# Patient Record
Sex: FEMALE | Race: WHITE
[De-identification: names, ages, dates, MRNs, and addresses within clinical notes are randomized per-mention and may not be internally consistent; named-entity substitution may affect disease eponyms.]

---

## 2017-08-31 ENCOUNTER — HOSPITAL ENCOUNTER (EMERGENCY)
Dept: HOSPITAL 39 - ER | Age: 42
Discharge: HOME | End: 2017-08-31
Payer: COMMERCIAL

## 2017-08-31 VITALS — DIASTOLIC BLOOD PRESSURE: 75 MMHG | SYSTOLIC BLOOD PRESSURE: 105 MMHG

## 2017-08-31 VITALS — TEMPERATURE: 97.7 F

## 2017-08-31 VITALS — OXYGEN SATURATION: 100 %

## 2017-08-31 DIAGNOSIS — Z88.6: ICD-10-CM

## 2017-08-31 DIAGNOSIS — R60.0: ICD-10-CM
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
## 2017-08-31 DIAGNOSIS — Z88.0: ICD-10-CM

## 2017-08-31 DIAGNOSIS — T63.441A: Primary | ICD-10-CM

## 2017-08-31 DIAGNOSIS — Z88.2: ICD-10-CM
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
## 2017-08-31 NOTE — ED.PDOC
History of Present Illness





- General


Chief Complaint: Allergic Reaction


Stated Complaint: yellowjacket stings


Time Seen by Provider: 08/31/17 14:55


Source: patient


Exam Limitations: no limitations





- History of Present Illness


Initial Comments: 





the patient is a 42-year-old  female presenting to the emergency room 

secondary to final loss seems to the right forearm and hand.  This occurred 

while she was at work apparently.  This occurred approximately 2 hours prior to 

arrival.  The patient was not going to come in but she started have significant 

swelling of the right upper extremitysurrounding the sites.  She is not having 

any shortness of breath.  She is not having any hives.  She does have 

significant itching in that upper extremity however.  She is able to move the 

hand and arm well.  She is neurovascularly intact. No definite evidence of 

anaphylaxis at this time. This is more of a regional sting reaction.


Timing/Duration: 1-3 hours


Severity: moderate


Improving Factors: nothing


Worsening Factors: nothing


Associated Symptoms: denies symptoms


Allergies/Adverse Reactions: 


Allergies





Codeine Allergy (Verified 08/31/17 15:03)


 


Penicillins Allergy (Verified 08/31/17 15:04)


 


Sulfamethoxazole w/Trimethoprim [From Bactrim] Allergy (Verified 08/31/17 15:04)


 











Review of Systems





- Review of Systems


Constitutional: States: no symptoms reported


EENTM: States: no symptoms reported


Respiratory: States: no symptoms reported


Cardiology: States: no symptoms reported


Gastrointestinal/Abdominal: States: no symptoms reported


Genitourinary: States: no symptoms reported


Musculoskeletal: States: no symptoms reported


Skin: States: see HPI


Neurological: States: no symptoms reported


Endocrine: States: no symptoms reported


All other Systems: No Change from Baseline





Past Medical History (General)





- Patient Medical History


Hx Seizures: No


Hx Stroke: No


Hx Dementia: No


Hx Asthma: No


Hx of COPD: No


Hx Cardiac Disorders: No


Hx Congestive Heart Failure: No


Hx Pacemaker: No


Hx Thyroid Disease: No


Hx Diabetes: No


Hx Gastroesophageal Reflux: No


Surgical History: tonsillectomy





Family Medical History





- Family History


  ** Mother


Family History: Unknown





Physical Exam





- Physical Exam


General Appearance: Alert, Comfortable, No apparent distress


Eye Exam: bilateral normal


Ears, Nose, Throat: hearing grossly normal, normal ENT inspection, normal 

pharynx


Neck: non-tender, full range of motion, supple


Respiratory: lungs clear, normal breath sounds, no respiratory distress, no 

accessory muscle use


Cardiovascular/Chest: normal peripheral pulses, no edema


Peripheral Pulses: radial,right: 2+, radial,left: 2+


Gastrointestinal/Abdominal: soft


Rectal Exam: deferred


Extremity: normal range of motion, no pedal edema, normal capillary refill, 

swelling - surrounding the sites


Neurologic: CNs II-XII nml as tested, no motor/sensory deficits, alert, normal 

mood/affect, oriented x 3


Skin Exam: normal color - with the exception of the erythema and edema 

surrounding the sting sites to the right upper extremity


Comments: 





 Vital Signs - 24 hr











  08/31/17





  14:45


 


Temperature 97.7 F


 


Pulse Rate [ 78





pulse ox] 


 


Respiratory 20





Rate 


 


Blood Pressure 131/78





[left brachial] 


 


O2 Sat by Pulse 100





Oximetry 














Progress





- Progress


Progress: 





08/31/17 16:05


the patient is a 42-year-old  female presenting secondary to multiple 

wasp stings to the right upper extremity with a significant regional reaction.  

The patient has received a dose of Solu-Medrol, prednisone, Zyrtec and 

Singulair.  She is to take 10 mg of Zyrtec daily for the next 5 days as well. 

Reaction appears to be subsiding. No evidence of anaphylaxis.  Vital signs have 

remained stable.  She does need to keep some form of an antihistamine at her 

car or on her person at all times.  ER warnings were given for any significant 

worsening.





Departure





- Departure


Clinical Impression: 


Insect stings


Qualifiers:


 Encounter type: initial encounter Injury intent: accidental or unintentional 

Qualified Code(s): T63.481A - Toxic effect of venom of other arthropod, 

accidental (unintentional), initial encounter





Disposition: Discharge to Home or Self Care


Condition: Fair


Departure Forms:  ED Discharge - Pt. Copy, Patient Portal Self Enrollment


Diet: regular diet


Activity: increase activity as tolerated


Referrals: 


EKTA HWANG [Primary Care Provider] - 1-2 Weeks


Additional Instructions: 


the patient is a 42-year-old  female presenting secondary to multiple 

wasp stings to the right upper extremity with a significant regional reaction.  

The patient has received a dose of Solu-Medrol, prednisone, Zyrtec and 

Singulair.  She is to take 10 mg of Zyrtec daily for the next 5 days as well. 

Reaction appears to be subsiding. No evidence of anaphylaxis.  Vital signs have 

remained stable.  She does need to keep some form of an antihistamine at her 

car or on her person at all times.  ER warnings were given for any significant 

worsening.

## 2018-01-25 ENCOUNTER — HOSPITAL ENCOUNTER (EMERGENCY)
Dept: HOSPITAL 39 - ER | Age: 43
Discharge: HOME | End: 2018-01-25
Payer: COMMERCIAL

## 2018-01-25 VITALS — TEMPERATURE: 96.9 F | OXYGEN SATURATION: 100 %

## 2018-01-25 VITALS — SYSTOLIC BLOOD PRESSURE: 110 MMHG | DIASTOLIC BLOOD PRESSURE: 72 MMHG

## 2018-01-25 DIAGNOSIS — N30.90: ICD-10-CM

## 2018-01-25 DIAGNOSIS — T50.995A: Primary | ICD-10-CM

## 2018-01-25 PROCEDURE — 81001 URINALYSIS AUTO W/SCOPE: CPT

## 2018-01-25 PROCEDURE — 87086 URINE CULTURE/COLONY COUNT: CPT

## 2018-01-25 PROCEDURE — 36416 COLLJ CAPILLARY BLOOD SPEC: CPT

## 2018-01-25 NOTE — ED.PDOC
History of Present Illness





- General


Chief Complaint: Respiratory Problem


Stated Complaint: c/o SOB after taking Azo


Time Seen by Provider: 01/25/18 05:02


Source: patient, Vital Signs reviewed


Additional Information: 





42 YEAR OLD HERE AFTER HAVING A ALLERGIC REACTION TO AZO  HER  GAVE HER 

A SUB Q SHOT OF EPINEPHRINE ( THEY HAD BEEN PRESCRIBED AFTER SHE HAD ALLERGIC 

REACTION TO YELLOW JACKET FEW WEEKS EARLIER


SHE FELT HER THROAT WAS CLOSING AND GOT SHORT OF BREATH


SHE ALSO REPORTS THAT SHE DEVELOPED RASH FOLLOWING 4-5 DAYS OF TAKING 

METHYLPREDNISONE  BUT SHE STATES SHE CAN TOLERATE DECADRON





- History of Present Illness


Timing/Duration: 1 hour


Severity: moderate


Improving Factors: medication


Worsening Factors: nothing, medication


Associated Symptoms: shortness of breath


Allergies/Adverse Reactions: 


Allergies





Codeine Allergy (Verified 01/25/18 04:58)


 


Influenza Vaccines Allergy (Verified 01/25/18 04:58)


 


Penicillins Allergy (Verified 01/25/18 04:58)


 


Sulfamethoxazole w/Trimethoprim [From Bactrim] Allergy (Verified 08/31/17 15:04)


 








Home Medications: 


Ambulatory Orders





NK [NK]  01/25/18 











Review of Systems





- Review of Systems


Constitutional: States: no symptoms reported


EENTM: States: throat swelling


Respiratory: States: no symptoms reported, short of breath


Cardiology: States: no symptoms reported


Gastrointestinal/Abdominal: States: no symptoms reported


Genitourinary: States: no symptoms reported


Musculoskeletal: States: no symptoms reported


Skin: States: no symptoms reported


Neurological: States: no symptoms reported


Endocrine: States: no symptoms reported


Hematologic/Lymphatic: States: no symptoms reported





Past Medical History (General)





- Patient Medical History


Hx Seizures: No


Hx Stroke: No


Hx Dementia: No


Hx Asthma: No


Hx of COPD: No


Hx Cardiac Disorders: No


Hx Congestive Heart Failure: No


Hx Pacemaker: No


Hx Hypertension: No


Hx Thyroid Disease: No


Hx Diabetes: No


Hx Gastroesophageal Reflux: No


Hx Renal Disease: No


Hx of HIV: No


Hx MRSA: No


Surgical History: tonsillectomy





- Vaccination History


Hx Tetanus, Diphtheria Vaccination: No


Hx Influenza Vaccination: No - allergy


Hx Pneumococcal Vaccination: No





- Social History


Hx Tobacco Use: No


Hx Alcohol Use: No


Hx Substance Use: No


Hx Substance Use Treatment: No


Hx Depression: No





Family Medical History





- Family History


  ** Mother


Family History: Unknown





Physical Exam





- Physical Exam


General Appearance: Agitated, No apparent distress


Eye Exam: bilateral normal


Ears, Nose, Throat: hearing grossly normal, normal ENT inspection, normal 

pharynx


Neck: non-tender, full range of motion, supple


Respiratory: chest non-tender, lungs clear, normal breath sounds, no 

respiratory distress, no accessory muscle use


Cardiovascular/Chest: normal peripheral pulses, regular rate, rhythm, no edema, 

no gallop, no JVD


Peripheral Pulses: radial,right: 2+, radial,left: 2+, femoral,right: 2+, femoral

,left: 2+, popliteal,right: 2+, popliteal,left: 2+, dorsalis pedis,right: 2+


Gastrointestinal/Abdominal: normal bowel sounds, non tender, soft, no 

organomegaly, no pulsatile mass





Progress





- Results/Orders


Results/Orders: 


5.55 AM POST TREATMENT  SHE IS FEELING BETTER LUNGS CLEAR  PHARYNX  NO EDEMA  

NO STRIDOR  NO WHEEZING MAY BE DISCHARGED  ADVISED TO TAKE BENADRYL  AND PEPCID 

20 MG PO OTC  RETUEN IF SYMPTOMS RECUR








Departure





- Departure


Clinical Impression: 


 Allergic reaction caused by a drug





Time of Disposition: 05:57


Disposition: Discharge to Home or Self Care


Condition: Good


Departure Forms:  ED Discharge - Pt. Copy, Patient Portal Self Enrollment


Diet: regular diet


Referrals: 


EKTA HWANG [Primary Care Provider] - 1-2 Weeks


Home Medications: 


Ambulatory Orders





NK [NK]  01/25/18 








Comments: 





SUGGEST BENADRYL 25 MG PO Q 6 H


PEPCID 20 MG PO Q DAY 


RETURN IF SYMPTOMS RECUR

## 2020-05-12 ENCOUNTER — HOSPITAL ENCOUNTER (OUTPATIENT)
Dept: HOSPITAL 39 - MRI | Age: 45
End: 2020-05-12
Attending: FAMILY MEDICINE
Payer: COMMERCIAL

## 2020-05-12 DIAGNOSIS — M75.22: ICD-10-CM

## 2020-05-12 DIAGNOSIS — M25.712: ICD-10-CM

## 2020-05-12 DIAGNOSIS — M19.012: ICD-10-CM

## 2020-05-12 DIAGNOSIS — M71.812: ICD-10-CM

## 2020-05-12 DIAGNOSIS — R60.9: ICD-10-CM

## 2020-05-12 DIAGNOSIS — S43.432A: ICD-10-CM

## 2020-05-12 DIAGNOSIS — M75.92: ICD-10-CM

## 2020-05-12 DIAGNOSIS — M75.42: Primary | ICD-10-CM

## 2020-05-12 DIAGNOSIS — M75.102: ICD-10-CM

## 2020-05-12 NOTE — MRI
EXAM DESCRIPTION: 

Upper Extremity Joint,Left: MRI.



CLINICAL HISTORY: 

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



COMPARISON: 

Shoulder left radiographs May 7.



TECHNIQUE: 

Multiplanar, high-field MRI, multiple sequences, without

contrast: Left shoulder. 



FINDINGS: 

Full-thickness tear of the posterior fibers of the supraspinatus

tendon which are retracted approximately 1.3 cm at the confluence

with the infraspinatus tendon. The anterior fibers are retracted

approximately 6 mm with fluid seen in the gap. Large spur on the

anterior greater tuberosity at the anterior fibers insertion

site. Intermediate signal and thickening indicating tendinopathy

infraspinatus tendon. Intermediate signal in the distal

subscapularis tendon with minimal fluid at the insertion. Grade 1

muscle atrophy in the infraspinatus and supraspinatus muscles.

Minimal fluid signal in the musculotendinous junction of the

supraspinatus. Fluid in the subacromion/subdeltoid bursa.

Cortical erosion in the mid greater tuberosity. Subcortical cyst

formation and marrow edema in the anterior greater tuberosity,

and posterior to the bicipital groove.



Minimal fluid in the subcoracoid bursa. Type I curvature of the

acromion process. Effusion in the AC joint capsule. Minimal

flattening of the coracoacromial arch with the distal clavicle

impressing on the anterior supraspinatus tendon. Coracoacromial

ligament and of the coracoid ligaments are intact.



Minimal glenohumeral joint effusion. Minimal thickening of the

proximal long head biceps tendon. Fluid in the bicipital groove.

Bicipital labral anchor is intact. Degenerative signal in the

anterior labrum. No labral tears. Minimal chondromalacia in the

glenohumeral joint but no subchondral lesions. 



IMPRESSION: 

1. Full-thickness tear of the supraspinatus tendon with more

retraction of the posterior fibers than anterior fibers. Fluid

within the gap between the anterior fibers in the tuberosity.

Minimal fluid in the musculotendinous junction. Spur formation on

the anterior tuberosity with marrow edema in the tuberosity and

posterior to the bicipital groove. Fluid in the groove and in the

subacromial-subdeltoid bursa.

2. Tendinopathy of the distal supraspinatus tendon but no tear.

Acute tendinopathy in the distal subscapularis tendon insertion. 

3. AC joint arthrosis and hypertrophy. No distal clavicle

impressing on the superior anterior aspect of the

musculotendinous junction of the supraspinatus with mild

flattening of the coracoacromial arch. Minimal subcoracoid

bursitis.

4. Labral degeneration anterior segment but no tear. No

glenohumeral osteochondral lesions. Tendinosis proximal long head

biceps tendon. Suprapatellar blank appears intact.



Electronically signed by:  Neftali Pereyra MD  5/12/2020 11:53 AM

CDT Workstation: [id number]